# Patient Record
(demographics unavailable — no encounter records)

---

## 2019-01-01 NOTE — HP
- Maternal History


Mother's Age: 37


 Status: 


HBSAG: Negative


Date: 19


RPR: Negative


Date: 07/10/19


Group B Strep: Negative


HIV: Negative





- Maternal Risks


OB Risks: post dates; ectopic x1  right ovary removed; SAB x3; sickle cell 

carrier - f.o.b negative; right shoulder surgery 2016.  admitted to nursery at 

2141





[place] Data





- Admission


Date of Admission: 10/02/19


Admission Time: 21:


Date of Delivery: 10/02/19


Time of Delivery: 21:26


Wks Gestation by Dates: 40.0


Wks Gestation by Sono: 40.2


Infant Gender: Male


Type of Delivery: Primary C/S


Reason for C Section: failure to progress


Apgar Score @1 Minute: 9


Apgar score @ 5 Minutes: 9


Birth Weight: 8 lb 1.773 oz


Birth Length: 21 in


Head Circumference, Admission: 35


Chest Circumference: 34


Abdominal Girth: 32





- Vital Signs


  ** Left Upper Arm


Blood Pressure: 64/34





  ** Right Upper Arm


Blood Pressure: 62/33





  ** Left Calf


Blood Pressure: 68/36





  ** Right Calf


Blood Pressure: 66/39





- Labs


Labs: 


 Baby's Blood Type, Jam











Cord Blood Type  B POSITIVE   10/03/19  00:00    


 


JT, Poly Interpret  Negative  (NEGATIVE)   10/03/19  00:00    














 Infant, Physical Exam





- [place] Infant, Admission Exam


Birth Weight: 8 lb 1.773 oz


Birth Length: 21 in


Chest Circumference: 34


Initial Vital Signs: 


 Initial Vital Signs











Temp Pulse Resp


 


 99.8 F H  139   41 


 


 10/02/19 21:26  10/02/19 21:26  10/02/19 21:26











General Appearance: Yes: No Abnormalities


Skin: Yes: No Abnormalities


Head: Yes: No Abnormalities


Eyes: Yes: No Abnormalities


Ears: Yes: No Abnormalities


Nose: Yes: No Abnormalities


Mouth: Yes: No Abnormalities


Chest: Yes: No Abnormalities


Lungs/Respiratory: Yes: No Abnormalities


Cardiac: Yes: No Abnormalities


Abdomen: Yes: No Abnormalities


Gastrointestinal: Yes: No Abnormalities


Genitalia: No Abnormalities


Anus: Yes: No Abnormalities


Extremities: Yes: No Abnormalities


Clavicles: No abnormalities


Spine: Yes: No Abnormalities


Neuro: Yes: No Abnormalities





- Other Findings/Remarks


Other Findings/Remarks: 





1 day male born to 37  mom by c/s.  BF and Enfamil. Routine care. Follow up 

[facility], [address], [place], [address] upon discharge. [phone].

## 2019-01-01 NOTE — DS
- Maternal History


Mother's Age: 37


 Status: 


HBSAG: Negative


Date: 19


RPR: Negative


Date: 07/10/19


Group B Strep: Negative


HIV: Negative





- Maternal Risks


OB Risks: post dates; ectopic x1  right ovary removed; SAB x3; sickle cell 

carrier - f.o.b negative; right shoulder surgery 2016.  admitted to nursery at 

2141





[place] Data





- Admission


Date of Admission: 10/02/19


Admission Time: :


Date of Delivery: 10/02/19


Time of Delivery: 21:26


Wks Gestation by Dates: 40.0


Wks Gestation by Sono: 40.2


Infant Gender: Male


Type of Delivery: Primary C/S


Reason for C Section: failure to progress


Apgar Score @1 Minute: 9


Apgar score @ 5 Minutes: 9


Birth Weight: 8 lb 1.773 oz


Birth Length: 21 in


Head Circumference, Admission: 35


Chest Circumference: 34


Abdominal Girth: 32





- Vital Signs


  ** Left Upper Arm


Blood Pressure: 64/34





  ** Right Upper Arm


Blood Pressure: 62/33





  ** Left Calf


Blood Pressure: 68/36





  ** Right Calf


Blood Pressure: 66/39





- Labs


Labs: 


 Baby's Blood Type, Jam











Cord Blood Type  B POSITIVE   10/03/19  00:00    


 


JT, Poly Interpret  Negative  (NEGATIVE)   10/03/19  00:00    














 PE, Discharge





- Physical Exam


Last Weight Documented: 7 lb 14.88 oz


Vital Signs: 


 Vital Signs











Temperature  98.2 F   10/04/19 08:10


 


Pulse Rate  139   10/02/19 21:26


 


Respiratory Rate  41   10/02/19 21:26


 


Blood Pressure  64/34   10/03/19 09:28


 


O2 Sat by Pulse Oximetry (%)      








 SpO2





Preductal SpO2, Right Arm        100


Postductal SpO2 [Left Leg]       100








General Appearance: Yes: No Abnormalities


Skin: Yes: No Abnormalities


Head: Yes: No Abnormalities


Eyes: Yes: No Abnormalities


Ears: Yes: No Abnormalities


Nose: Yes: No Abnormalities


Mouth: Yes: No Abnormalities


Chest: Yes: No Abnormalities


Lungs/Respiratory: Yes: No Abnormalities


Cardiac: Yes: No Abnormalities


Abdomen: Yes: No Abnormalities


Gastrointestinal: Yes: No Abnormalities


Genitalia: No Abnormalities


Anus: Yes: No Abnormalities


Extremities: Yes: No Abnormalities


Spine: Yes: No Abnormalities


Reflexes: [place]: Present, Rooting: Present, Sucking: Present


Neuro: Yes: No Abnormalities


Cry: Yes: No Abnormalities


Preductal SpO2, Right Arm: 100


  ** Left Leg


Postductal SpO2: 100


Other Findings/Remarks: 





2 day male born to 37  mom by c/s.  BF and Enfamil. Routine care. Follow up 

[facility], [address] [place] [address], [address] upon discharge. [phone] 

on  at 9:30 am.


Medications








Discontinued Medications





Hepatitis B Vaccine (Engerix-B 10 Mcg/0.5 Ml *Pediatric* -)  10 mcg IM .ONCE ONE


   Stop: 10/03/19 11:31


   Last Admin: 10/03/19 14:00 Dose:  10 mcg











Discharge Summary


Problems reviewed: Yes


Reason For Visit: 


Hospital Course: 


normal


Condition: Good





- Instructions


Referrals: 


Darek London MD [Staff Physician] -  ([facility], [address] [place] [address], [address] on  at 9:30 am. [phone].  )


Disposition: HOME